# Patient Record
Sex: FEMALE | Race: WHITE | Employment: UNEMPLOYED | URBAN - METROPOLITAN AREA
[De-identification: names, ages, dates, MRNs, and addresses within clinical notes are randomized per-mention and may not be internally consistent; named-entity substitution may affect disease eponyms.]

---

## 2019-04-11 ENCOUNTER — HOSPITAL ENCOUNTER (OUTPATIENT)
Dept: RADIOLOGY | Facility: HOSPITAL | Age: 59
Discharge: HOME | End: 2019-04-11
Attending: OTOLARYNGOLOGY
Payer: COMMERCIAL

## 2019-04-11 ENCOUNTER — OFFICE VISIT (OUTPATIENT)
Dept: OTOLARYNGOLOGY | Facility: CLINIC | Age: 59
End: 2019-04-11
Payer: COMMERCIAL

## 2019-04-11 VITALS
SYSTOLIC BLOOD PRESSURE: 133 MMHG | TEMPERATURE: 98 F | WEIGHT: 132.25 LBS | HEART RATE: 63 BPM | DIASTOLIC BLOOD PRESSURE: 85 MMHG | BODY MASS INDEX: 20.72 KG/M2

## 2019-04-11 DIAGNOSIS — E04.1 THYROID NODULE: Primary | ICD-10-CM

## 2019-04-11 DIAGNOSIS — E04.1 THYROID NODULE: ICD-10-CM

## 2019-04-11 PROCEDURE — 99203 OFFICE O/P NEW LOW 30 MIN: CPT | Mod: S$GLB,,, | Performed by: OTOLARYNGOLOGY

## 2019-04-11 PROCEDURE — 76536 US SOFT TISSUE HEAD NECK THYROID: ICD-10-PCS | Mod: 26,,, | Performed by: RADIOLOGY

## 2019-04-11 PROCEDURE — 3008F BODY MASS INDEX DOCD: CPT | Mod: CPTII,S$GLB,, | Performed by: OTOLARYNGOLOGY

## 2019-04-11 PROCEDURE — 76536 US EXAM OF HEAD AND NECK: CPT | Mod: TC

## 2019-04-11 PROCEDURE — 99999 PR PBB SHADOW E&M-NEW PATIENT-LVL III: ICD-10-PCS | Mod: PBBFAC,,, | Performed by: OTOLARYNGOLOGY

## 2019-04-11 PROCEDURE — 3008F PR BODY MASS INDEX (BMI) DOCUMENTED: ICD-10-PCS | Mod: CPTII,S$GLB,, | Performed by: OTOLARYNGOLOGY

## 2019-04-11 PROCEDURE — 99203 PR OFFICE/OUTPT VISIT, NEW, LEVL III, 30-44 MIN: ICD-10-PCS | Mod: S$GLB,,, | Performed by: OTOLARYNGOLOGY

## 2019-04-11 PROCEDURE — 99999 PR PBB SHADOW E&M-NEW PATIENT-LVL III: CPT | Mod: PBBFAC,,, | Performed by: OTOLARYNGOLOGY

## 2019-04-11 PROCEDURE — 76536 US EXAM OF HEAD AND NECK: CPT | Mod: 26,,, | Performed by: RADIOLOGY

## 2019-04-11 RX ORDER — CITALOPRAM 20 MG/1
TABLET, FILM COATED ORAL
Refills: 11 | COMMUNITY
Start: 2019-03-25 | End: 2023-12-28

## 2019-04-11 RX ORDER — LEVOTHYROXINE, LIOTHYRONINE 38; 9 UG/1; UG/1
TABLET ORAL
Refills: 2 | COMMUNITY
Start: 2019-03-09 | End: 2022-01-12 | Stop reason: SDUPTHER

## 2019-04-11 NOTE — PROGRESS NOTES
Chief Complaint   Patient presents with    growth on thyroid       HPI   58 y.o. female presents for evaluation of a longstanding thyroid nodule.  She has been followed by primary care physician for this problem.  She has undergone serial ultrasounds his office and reports that this nodule has enlarged over time. She has not had a biopsy.  She reports that she has some hypothyroid symptoms and is concerned that she may actually be hypothyroid.  She is taking Aguirre thyroid.  She reports an extensive family history of benign thyroid problems but denies a family history of thyroid cancer.  She has not had radiation exposure aside from routine x-rays.    Review of Systems   Constitutional: Negative for fatigue and unexpected weight change.   HENT: Per HPI.  Eyes: Negative for visual disturbance.   Respiratory: Negative for shortness of breath, hemoptysis   Cardiovascular: Negative for chest pain and palpitations.   Musculoskeletal: Negative for decreased ROM, back pain.   Skin: Negative for rash, sunburn, itching.   Neurological: Negative for dizziness and seizures.   Hematological: Negative for adenopathy. Does not bruise/bleed easily.   Endocrine: Negative for rapid weight loss/weight gain, heat/cold intolerance.     Past Medical History   There is no problem list on file for this patient.          Past Surgical History   History reviewed. No pertinent surgical history.      Family History   History reviewed. No pertinent family history.        Social History   .  Social History     Socioeconomic History    Marital status:      Spouse name: Not on file    Number of children: Not on file    Years of education: Not on file    Highest education level: Not on file   Occupational History    Not on file   Social Needs    Financial resource strain: Not on file    Food insecurity:     Worry: Not on file     Inability: Not on file    Transportation needs:     Medical: Not on file     Non-medical: Not on file    Tobacco Use    Smoking status: Never Smoker   Substance and Sexual Activity    Alcohol use: Not on file    Drug use: Not on file    Sexual activity: Not on file   Lifestyle    Physical activity:     Days per week: Not on file     Minutes per session: Not on file    Stress: Not on file   Relationships    Social connections:     Talks on phone: Not on file     Gets together: Not on file     Attends Orthodox service: Not on file     Active member of club or organization: Not on file     Attends meetings of clubs or organizations: Not on file     Relationship status: Not on file   Other Topics Concern    Not on file   Social History Narrative    Not on file         Allergies   Review of patient's allergies indicates:   Allergen Reactions    Aleve [naproxen sodium]            Physical Exam     Vitals:    04/11/19 1416   BP: 133/85   Pulse: 63   Temp: 97.7 °F (36.5 °C)         Body mass index is 20.72 kg/m².      General: AOx3, NAD   Respiratory:  Symmetric chest rise, normal effort  Nose: No gross nasal septal deviation. Inferior Turbinates WNL bilaterally. No septal perforation. No masses/lesions.   Oral Cavity:  Oral Tongue mobile, no lesions noted. Hard Palate WNL. No buccal or FOM lesions.  Oropharynx:  No masses/lesions of the posterior pharyngeal wall. Tonsillar fossa without lesions. Soft palate without masses. Midline uvula.   Neck: No scars.  No cervical lymphadenopathy, thyromegaly.  Palpable thyroid nodule just right of midline.  Normal range of motion.    Face: House Brackmann I bilaterally. Resolving ecchymosis of central brow that she reports is sequela of a fall from a horse.    NP: No lesions of posterior wall  OP: No lesions of posterior wall or BOT. BOT is soft to palpation  Larynx: No lesions of glottic or supraglottic larynx. Normal vocal fold mobility    HP: No lesions of pyriform sinuses or postcricoid region  Mirror examination was performed.      Assessment/Plan  Problem List Items  Addressed This Visit        Endocrine    Thyroid nodule - Primary     Will obtain an ultrasound to assess features.  Will order FNA if indicated.  Will check TSH today given her complaints of hypothyroid symptoms.         Relevant Orders    US Soft Tissue Head Neck Thyroid    TSH

## 2019-04-11 NOTE — ASSESSMENT & PLAN NOTE
Will obtain an ultrasound to assess features.  Will order FNA if indicated.  Will check TSH today given her complaints of hypothyroid symptoms.

## 2019-04-12 ENCOUNTER — TELEPHONE (OUTPATIENT)
Dept: OTOLARYNGOLOGY | Facility: CLINIC | Age: 59
End: 2019-04-12

## 2019-04-12 DIAGNOSIS — E04.1 THYROID NODULE: Primary | ICD-10-CM

## 2019-04-22 ENCOUNTER — HOSPITAL ENCOUNTER (OUTPATIENT)
Dept: RADIOLOGY | Facility: HOSPITAL | Age: 59
Discharge: HOME | End: 2019-04-22
Attending: OTOLARYNGOLOGY
Payer: COMMERCIAL

## 2019-04-22 DIAGNOSIS — E04.1 THYROID NODULE: ICD-10-CM

## 2019-04-22 PROCEDURE — 10005 FNA BX W/US GDN 1ST LES: CPT

## 2019-04-22 PROCEDURE — 88173 CYTOPATH EVAL FNA REPORT: CPT | Mod: 26,,, | Performed by: PATHOLOGY

## 2019-04-22 PROCEDURE — 10005 FNA BX W/US GDN 1ST LES: CPT | Mod: ,,, | Performed by: RADIOLOGY

## 2019-04-22 PROCEDURE — 88173 CYTOLOGY SPECIMEN-FNA NON-RADIOLOGY CLINICIAN PERFORMED W/O ON SITE: ICD-10-PCS | Mod: 26,,, | Performed by: PATHOLOGY

## 2019-04-22 PROCEDURE — 10005 US FINE NEEDLE ASPIRATION BIOPSY, FIRST LESION: ICD-10-PCS | Mod: ,,, | Performed by: RADIOLOGY

## 2019-04-22 PROCEDURE — 88173 CYTOPATH EVAL FNA REPORT: CPT | Performed by: PATHOLOGY

## 2019-04-22 NOTE — H&P
Radiology History & Physical      SUBJECTIVE:     Chief Complaint: Thyroid Nodule    History of Present Illness:  Chitra Amaral is a 58 y.o. female who presents for fine needle aspiration of a TI-RADS catergoy 4 nodule in the thyroid isthmus.     No past medical history on file.  No past surgical history on file.    Home Meds:   Prior to Admission medications    Medication Sig Start Date End Date Taking? Authorizing Provider   citalopram (CELEXA) 20 MG tablet  3/25/19   Historical Provider, MD   NP THYROID 60 mg Tab  3/9/19   Historical Provider, MD     Anticoagulants/Antiplatelets: no anticoagulation    Allergies:   Review of patient's allergies indicates:   Allergen Reactions    Aleve [naproxen sodium]      Sedation History:  no adverse reactions    Review of Systems:   Hematological: no known coagulopathies  Respiratory: no shortness of breath  Cardiovascular: no chest pain  Gastrointestinal: no abdominal pain  Genito-Urinary: no dysuria  Musculoskeletal: negative  Neurological: no TIA or stroke symptoms         OBJECTIVE:     Vital Signs (Most Recent)       Physical Exam:  ASA: 2  Mallampati: 2  General: no acute distress  Mental Status: alert and oriented to person, place and time  HEENT: normocephalic, atraumatic  Chest: unlabored breathing  Heart: regular heart rate  Abdomen: nondistended  Extremity: moves all extremities    Laboratory  No results found for: INR  No results found for: WBC, HGB, HCT, MCV, PLT No results found for: GLU, NA, K, CL, CO2, BUN, CREATININE, CALCIUM, MG, ALT, AST, ALBUMIN, BILITOT, BILIDIR    ASSESSMENT/PLAN:     Sedation Plan: Local Anesthetic  Patient will undergo thyroid nodule biopsy.    Vinnie Tran MD  Interventional Radiology PGY-II  [facility]  Pager: [phone]

## 2019-04-23 ENCOUNTER — TELEPHONE (OUTPATIENT)
Dept: OTOLARYNGOLOGY | Facility: CLINIC | Age: 59
End: 2019-04-23

## 2020-03-25 DIAGNOSIS — Z78.0 MENOPAUSE: Primary | ICD-10-CM

## 2020-03-25 RX ORDER — STANOZOLOL 100 %
2 POWDER (GRAM) MISCELLANEOUS
Qty: 36 G | Refills: 2 | Status: SHIPPED | OUTPATIENT
Start: 2020-03-25 | End: 2021-01-06 | Stop reason: SDUPTHER

## 2021-01-04 PROBLEM — L50.9 HIVES: Status: ACTIVE | Noted: 2021-01-04

## 2021-01-04 PROBLEM — Z78.0 MENOPAUSE: Status: ACTIVE | Noted: 2021-01-04

## 2021-01-04 PROBLEM — R68.89 COLD FEELING: Status: ACTIVE | Noted: 2021-01-04

## 2021-01-04 PROBLEM — R53.82 CHRONIC FATIGUE: Status: ACTIVE | Noted: 2021-01-04

## 2021-01-04 PROBLEM — F51.01 PRIMARY INSOMNIA: Status: ACTIVE | Noted: 2021-01-04

## 2022-04-13 PROBLEM — E03.9 ACQUIRED HYPOTHYROIDISM: Status: ACTIVE | Noted: 2022-04-13

## 2025-03-04 NOTE — TELEPHONE ENCOUNTER
I left Chitra Amaral a message asking her to call me back regarding her TSH and ultrasound results.     Quality 47: Advance Care Plan: Advance Care Planning discussed and documented in the medical record; patient did not wish or was not able to name a surrogate decision maker or provide an advance care plan. Quality 226: Preventive Care And Screening: Tobacco Use: Screening And Cessation Intervention: Patient screened for tobacco use and is an ex/non-smoker Detail Level: Detailed